# Patient Record
Sex: MALE | Race: WHITE | NOT HISPANIC OR LATINO | URBAN - METROPOLITAN AREA
[De-identification: names, ages, dates, MRNs, and addresses within clinical notes are randomized per-mention and may not be internally consistent; named-entity substitution may affect disease eponyms.]

---

## 2017-02-14 ENCOUNTER — OFFICE VISIT (OUTPATIENT)
Dept: CARDIOLOGY | Facility: CLINIC | Age: 77
End: 2017-02-14

## 2017-02-14 VITALS
HEART RATE: 77 BPM | HEIGHT: 72 IN | DIASTOLIC BLOOD PRESSURE: 72 MMHG | WEIGHT: 210 LBS | SYSTOLIC BLOOD PRESSURE: 120 MMHG | BODY MASS INDEX: 28.44 KG/M2

## 2017-02-14 DIAGNOSIS — I10 ESSENTIAL HYPERTENSION: ICD-10-CM

## 2017-02-14 DIAGNOSIS — I48.0 PAROXYSMAL ATRIAL FIBRILLATION (HCC): Primary | ICD-10-CM

## 2017-02-14 DIAGNOSIS — I44.30 AVB (ATRIOVENTRICULAR BLOCK): ICD-10-CM

## 2017-02-14 PROCEDURE — 99213 OFFICE O/P EST LOW 20 MIN: CPT | Performed by: INTERNAL MEDICINE

## 2017-02-14 PROCEDURE — 93288 INTERROG EVL PM/LDLS PM IP: CPT | Performed by: INTERNAL MEDICINE

## 2017-02-14 RX ORDER — FERROUS SULFATE 325(65) MG
325 TABLET ORAL
COMMUNITY

## 2017-02-14 RX ORDER — OMEPRAZOLE 20 MG/1
20 CAPSULE, DELAYED RELEASE ORAL DAILY
COMMUNITY

## 2017-02-14 RX ORDER — UBIDECARENONE 100 MG
100 CAPSULE ORAL DAILY
COMMUNITY

## 2017-02-14 NOTE — PROGRESS NOTES
Bladimir BERTRAND Kehrt  1940  [phone]      02/14/2017    [facility] [place] [facility]IOLOG[facility]     Soto Javed MD  [address] [address]    Chief Complaint   Patient presents with   • Atrial Fibrillation   • Slow Heart Rate       Problem List:    PROBLEM LIST:  1. Atrial flutter:  a. Diagnosed in 1998.   b. Treated with Coumadin in 2002.   c. Failed Amiodarone and Tambocor therapy.   d. External cardioversion to normal sinus rhythm in 2003.   e. Holter monitor on 12/24/2004 with episodes of atrial flutter.   f. Echo on 02/28/2004 with EF of 55%, mild to moderate mitral regurgitation, left atrial size 4.5 cm.   g. Radiofrequency ablation of isthmus dependent flutter on 08/15/2005 with subsequent inducible atrial fibrillation.   2. History of atrial fibrillation:  a. Initiation of Tambocor therapy in August 2005 secondary to inducible atrial fibrillation as well as left atrial flutter.   b. Recurrent persistent atrial fibrillation despite maximum doses of Tambocor and Rythmol therapy.   c. Pulmonary vein isolation procedure on 01/10/2006 with radiofrequency ablation of AVNRT at the same time.   d. Left atrial flutter in April 2006 with subsequent external cardioversion to normal sinus rhythm on 04/25/2006.  e. Hospitalization initiation of Tikosyn therapy 10/12/2006.   f. Implantation of a dual chamber permanent pacemaker secondary to symptomatic bradycardia (Medtronic device) on 01/15/2007.  g. External cardioversion of atrial fibrillation, March 2011 with subsequent recurrence of atrial fibrillation 48-hours later.   h. Persistent atrial fibrillation since October 2013 following his left knee surgical replacement.   i. Echocardiogram, 03/08/2013, LV size and function normal, left ventricular ejection fraction 55% to 60%, mild TR, moderate MR.  j. 10/07/2014 pacemaker generator change, Medtronic Adapta ADDR 01, serial #[id number] per Dr. Belcher at [facility]  [place].  3. Depression.   4. Obstructive sleep apnea.   5. Degenerative joint disease.  6. GI bleed:  a. Transfusion of 4 units of packed red blood cells during recent hospitalization, September 2015.  b. EGD/colonoscopy, no active bleeding.   7. Surgical history.  a. Right total knee replacement.   b. Left shoulder repair.  c. 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  d. Bowel resection, 07/1920/07, [facility].  Allergies  Allergies   Allergen Reactions   • Morphine And Related    • Zoloft [Sertraline Hcl]        Current Medications    Current Outpatient Prescriptions:   •  Alum Hydroxide-Mag Carbonate (GAVISCON PO), Take  by mouth as needed., Disp: , Rfl:   •  apixaban (ELIQUIS) 5 MG tablet tablet, Take 5 mg by mouth 2 (two) times a day., Disp: , Rfl:   •  celecoxib (CeleBREX) 200 MG capsule, Take 200 mg by mouth daily., Disp: , Rfl:   •  clonazePAM (KlonoPIN) 0.5 MG tablet, Take 0.5 mg by mouth 4 (four) times a day., Disp: , Rfl:   •  coenzyme Q10 100 MG capsule, Take 100 mg by mouth Daily., Disp: , Rfl:   •  diltiazem CD (CARDIZEM CD) 240 MG 24 hr capsule, Take 240 mg by mouth daily., Disp: , Rfl:   •  ferrous sulfate 325 (65 FE) MG tablet, Take 325 mg by mouth Daily With Breakfast., Disp: , Rfl:   •  HYDROcodone-acetaminophen (NORCO) 7.5-325 MG per tablet, Take 1 tablet by mouth 4 (four) times a day., Disp: , Rfl:   •  hydrOXYzine (ATARAX) 50 MG tablet, Take 50 mg by mouth daily., Disp: , Rfl:   •  lisinopril (PRINIVIL,ZESTRIL) 10 MG tablet, Take 10 mg by mouth daily., Disp: , Rfl:   •  magnesium gluconate (MAGONATE) 500 MG tablet, Take 500 mg by mouth daily., Disp: , Rfl:   •  Melatonin 10 MG capsule, Take 10 mg by mouth daily., Disp: , Rfl:   •  Multiple Vitamins-Minerals (MULTIVITAMIN ADULT PO), Take  by mouth daily., Disp: , Rfl:   •  omeprazole (priLOSEC) 20 MG capsule, Take 20 mg by mouth Daily., Disp: , Rfl:   •  QUEtiapine XR (SEROquel XR) 150 MG 24 hr tablet, Take 150 mg by mouth 2  "(two) times a day., Disp: , Rfl:   •  ranitidine (ZANTAC) 150 MG tablet, Take 150 mg by mouth 2 (two) times a day., Disp: , Rfl:   •  sertraline (ZOLOFT) 100 MG tablet, Take 100 mg by mouth daily., Disp: , Rfl:   •  traMADol-acetaminophen (ULTRACET) 37.5-325 MG per tablet, Take 1 tablet by mouth 4 (four) times a day., Disp: , Rfl:   •  vitamin C (ASCORBIC ACID) 500 MG tablet, Take 500 mg by mouth daily., Disp: , Rfl:   •  Vitamin D, Cholecalciferol, (CHOLECALCIFEROL) 400 UNITS tablet, Take 400 Units by mouth daily., Disp: , Rfl:   •  iron polysaccharides (FERREX 150) 150 MG capsule, Take 150 mg by mouth daily., Disp: , Rfl:     History of Present Illness   HPI    Pt presents for follow up of AF/bradycardia. Since the pt has seen us in clinic last, pt denies any syncope, SOB, CP, LH, and dizziness. Denies any hospitalizations, ER visits, bleeding, or TIA/CVA symptoms. Overall feels well except fo chronic fatigue. BP at home stable. Has had chronic right ankle ulcer issues.      ROS:  General:  + fatigue, No weight gain or loss  Cardiovascular:  Denies CP, PND, syncope, near syncope, edema or palpitations.  Pulmonary:  No OLIVER, cough, or wheezing    Vitals:    02/14/17 1111   BP: 120/72   BP Location: Left arm   Pulse: 77   Weight: 210 lb (95.3 kg)   Height: 72\" (182.9 cm)       PE:  NAD  Neck: no JVD, no carotid bruits, no TM  Heart RRR, NL S1, S2, S4 present, no rubs, murmurs  Lungs: CTA  Abd: soft, non-tender, NL BS  Ext: No musculoskeletal deformities    Diagnostic Data:  Procedures      1. Paroxysmal atrial fibrillation    2. AVB (atrioventricular block)    3. Essential hypertension        PM Interrogation: NL PM fxn, Nl battery fxn, 85% AF     Plan:  1) AF: asymptomatic no change in frequency  Continue present medications. NL pacemaker function.  2) Anticoagulation  Continue NOAC  3) Bradycardia: NL fxn  4) HTN: stable  Wt loss, exercise, salt reduction    F/up in 6 months      "

## 2017-05-31 ENCOUNTER — CLINICAL SUPPORT NO REQUIREMENTS (OUTPATIENT)
Dept: CARDIOLOGY | Facility: CLINIC | Age: 77
End: 2017-05-31

## 2017-05-31 DIAGNOSIS — I48.0 PAROXYSMAL ATRIAL FIBRILLATION (HCC): ICD-10-CM

## 2017-05-31 DIAGNOSIS — I44.30 ATRIOVENTRICULAR BLOC: ICD-10-CM

## 2017-05-31 PROCEDURE — 93294 REM INTERROG EVL PM/LDLS PM: CPT | Performed by: INTERNAL MEDICINE

## 2017-05-31 PROCEDURE — 93296 REM INTERROG EVL PM/IDS: CPT | Performed by: INTERNAL MEDICINE

## 2017-09-12 ENCOUNTER — OFFICE VISIT (OUTPATIENT)
Dept: CARDIOLOGY | Facility: CLINIC | Age: 77
End: 2017-09-12

## 2017-09-12 VITALS
HEART RATE: 74 BPM | HEIGHT: 72 IN | SYSTOLIC BLOOD PRESSURE: 136 MMHG | BODY MASS INDEX: 29.12 KG/M2 | DIASTOLIC BLOOD PRESSURE: 70 MMHG | WEIGHT: 215 LBS

## 2017-09-12 DIAGNOSIS — I10 ESSENTIAL HYPERTENSION: ICD-10-CM

## 2017-09-12 DIAGNOSIS — Z86.79 HISTORY OF ATRIAL FIBRILLATION: ICD-10-CM

## 2017-09-12 DIAGNOSIS — R00.1 BRADYCARDIA: ICD-10-CM

## 2017-09-12 DIAGNOSIS — I48.4 ATYPICAL ATRIAL FLUTTER (HCC): Primary | ICD-10-CM

## 2017-09-12 PROCEDURE — 99213 OFFICE O/P EST LOW 20 MIN: CPT | Performed by: INTERNAL MEDICINE

## 2017-09-12 PROCEDURE — 93288 INTERROG EVL PM/LDLS PM IP: CPT | Performed by: INTERNAL MEDICINE

## 2017-09-12 NOTE — PROGRESS NOTES
Bladimir BERTRAND Kehrt  1940  [phone]      09/12/2017    [facility] CARDIOLOGY     Soto Javed MD  [address] [address]    Chief Complaint   Patient presents with   • Atrial Fibrillation   • Slow Heart Rate       Problem List:  PROBLEM LIST:  1. Atrial flutter:  a. Diagnosed in 1998.   b. Treated with Coumadin in 2002.   c. Failed Amiodarone and Tambocor therapy.   d. External cardioversion to normal sinus rhythm in 2003.   e. Holter monitor on 12/24/2004 with episodes of atrial flutter.   f. Echo on 02/28/2004 with EF of 55%, mild to moderate mitral regurgitation, left atrial size 4.5 cm.   g. Radiofrequency ablation of isthmus dependent flutter on 08/15/2005 with subsequent inducible atrial fibrillation.   2. History of atrial fibrillation:  a. Initiation of Tambocor therapy in August 2005 secondary to inducible atrial fibrillation as well as left atrial flutter.   b. Recurrent persistent atrial fibrillation despite maximum doses of Tambocor and Rythmol therapy.   c. Pulmonary vein isolation procedure on 01/10/2006 with radiofrequency ablation of AVNRT at the same time.   d. Left atrial flutter in April 2006 with subsequent external cardioversion to normal sinus rhythm on 04/25/2006.  e. Hospitalization initiation of Tikosyn therapy 10/12/2006.   f. Implantation of a dual chamber permanent pacemaker secondary to symptomatic bradycardia (Medtronic device) on 01/15/2007.  g. External cardioversion of atrial fibrillation, March 2011 with subsequent recurrence of atrial fibrillation 48-hours later.   h. Persistent atrial fibrillation since October 2013 following his left knee surgical replacement.   i. Echocardiogram, 03/08/2013, LV size and function normal, left ventricular ejection fraction 55% to 60%, mild TR, moderate MR.  j. 10/07/2014 pacemaker generator change, Medtronic Adapta ADDR 01, serial #[id number] per Dr. Belcher at [facility].  3. Depression.    4. Obstructive sleep apnea.   5. Degenerative joint disease.  6. GI bleed:  a. Transfusion of 4 units of packed red blood cells during recent hospitalization, September 2015.  b. EGD/colonoscopy, no active bleeding.   7. Surgical history.  a. Right total knee replacement.   b. Left shoulder repair.  c. 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  d. Bowel resection, 07/1920/07, [facility].    Allergies  Allergies   Allergen Reactions   • Morphine And Related    • Zoloft [Sertraline Hcl]        Current Medications    Current Outpatient Prescriptions:   •  Alum Hydroxide-Mag Carbonate (GAVISCON PO), Take  by mouth as needed., Disp: , Rfl:   •  apixaban (ELIQUIS) 5 MG tablet tablet, Take 5 mg by mouth 2 (two) times a day., Disp: , Rfl:   •  celecoxib (CeleBREX) 200 MG capsule, Take 200 mg by mouth daily., Disp: , Rfl:   •  clonazePAM (KlonoPIN) 0.5 MG tablet, Take 0.5 mg by mouth 4 (four) times a day., Disp: , Rfl:   •  coenzyme Q10 100 MG capsule, Take 100 mg by mouth Daily., Disp: , Rfl:   •  diltiazem CD (CARDIZEM CD) 240 MG 24 hr capsule, Take 240 mg by mouth daily., Disp: , Rfl:   •  ferrous sulfate 325 (65 FE) MG tablet, Take 325 mg by mouth Daily With Breakfast., Disp: , Rfl:   •  HYDROcodone-acetaminophen (NORCO) 7.5-325 MG per tablet, Take 1 tablet by mouth 4 (four) times a day., Disp: , Rfl:   •  hydrOXYzine (ATARAX) 50 MG tablet, Take 50 mg by mouth daily., Disp: , Rfl:   •  lisinopril (PRINIVIL,ZESTRIL) 10 MG tablet, Take 10 mg by mouth daily., Disp: , Rfl:   •  magnesium gluconate (MAGONATE) 500 MG tablet, Take 500 mg by mouth daily., Disp: , Rfl:   •  Melatonin 10 MG capsule, Take 10 mg by mouth daily., Disp: , Rfl:   •  Multiple Vitamins-Minerals (MULTIVITAMIN ADULT PO), Take  by mouth daily., Disp: , Rfl:   •  omeprazole (priLOSEC) 20 MG capsule, Take 20 mg by mouth Daily., Disp: , Rfl:   •  QUEtiapine XR (SEROquel XR) 150 MG 24 hr tablet, Take 150 mg by mouth 2 (two) times a day.,  "Disp: , Rfl:   •  ranitidine (ZANTAC) 150 MG tablet, Take 150 mg by mouth 2 (two) times a day., Disp: , Rfl:   •  sertraline (ZOLOFT) 100 MG tablet, Take 100 mg by mouth daily., Disp: , Rfl:   •  traMADol-acetaminophen (ULTRACET) 37.5-325 MG per tablet, Take 1 tablet by mouth 4 (four) times a day., Disp: , Rfl:   •  vitamin C (ASCORBIC ACID) 500 MG tablet, Take 500 mg by mouth daily., Disp: , Rfl:   •  Vitamin D, Cholecalciferol, (CHOLECALCIFEROL) 400 UNITS tablet, Take 400 Units by mouth daily., Disp: , Rfl:     History of Present Illness   HPI    Pt presents for follow up of AF/bradycardia. Since the pt has seen us in clinic last, pt denies any syncope,  CP, LH, and dizziness. Denies any hospitalizations, ER visits, bleeding, or TIA/CVA symptoms. Overall feels well except for fatigue. Anemia significantly improved. Still being treated for ankle ulcer. BP stable at home.    ROS:  General:  + fatigue, No weight gain or loss  Cardiovascular:  Denies CP, PND, syncope, near syncope, edema + rare palpitations.  Pulmonary:  Mild  OLIVER, no cough, or wheezing    Vitals:    09/12/17 1135   BP: 136/70   BP Location: Left arm   Patient Position: Sitting   Pulse: 74   Weight: 215 lb (97.5 kg)   Height: 72\" (182.9 cm)       PE:  General: NAD  Neck: no JVD, no carotid bruits, no TM  Heart RRR, NL S1, S2, S4 present, no rubs, murmurs  Lungs: CTA, no wheezes, rhonchi, or rales  Abd: soft, non-tender, NL BS  Ext: No musculoskeletal deformities, no edema, cyanosis, or clubbing  Psych: normal mood and affect    Diagnostic Data:  Procedures      1. Atypical atrial flutter    2. History of atrial fibrillation    3. Bradycardia    4. Essential hypertension        PM Interrogation: NL PM fxn, Nl battery fxn, 86% AF       Plan:  1) AF: asymptomatic no change in frequency  Continue present medications. NL pacemaker function.  2) Anticoagulation  Continue NOAC  3) Bradycardia: NL fxn  4) HTN: stable  Wt loss, exercise, salt reduction    F/up " in 6 months

## 2017-11-30 ENCOUNTER — CLINICAL SUPPORT NO REQUIREMENTS (OUTPATIENT)
Dept: CARDIOLOGY | Facility: CLINIC | Age: 77
End: 2017-11-30

## 2017-11-30 DIAGNOSIS — I44.2 ATRIOVENTRICULAR BLOCK, COMPLETE (HCC): ICD-10-CM

## 2017-11-30 PROCEDURE — 93294 REM INTERROG EVL PM/LDLS PM: CPT | Performed by: INTERNAL MEDICINE

## 2017-11-30 PROCEDURE — 93296 REM INTERROG EVL PM/IDS: CPT | Performed by: INTERNAL MEDICINE

## 2018-05-23 ENCOUNTER — TELEPHONE (OUTPATIENT)
Dept: CARDIOLOGY | Facility: CLINIC | Age: 78
End: 2018-05-23

## 2018-05-24 ENCOUNTER — TELEPHONE (OUTPATIENT)
Dept: CARDIOLOGY | Facility: CLINIC | Age: 78
End: 2018-05-24

## 2018-05-24 NOTE — TELEPHONE ENCOUNTER
Patient has had an ulcer on his right ankle for 3 years. He has not had his procedure yet, but the doctor would like to hold Eliquis for 2 or 3 days. The ulcer is continuously bleeding and he feels like it is the Eliquis. Would this be ok with you?

## 2018-06-13 ENCOUNTER — TELEPHONE (OUTPATIENT)
Dept: CARDIOLOGY | Facility: CLINIC | Age: 78
End: 2018-06-13

## 2018-06-13 ENCOUNTER — CLINICAL SUPPORT NO REQUIREMENTS (OUTPATIENT)
Dept: CARDIOLOGY | Facility: CLINIC | Age: 78
End: 2018-06-13

## 2018-06-13 DIAGNOSIS — I48.4 ATYPICAL ATRIAL FLUTTER (HCC): ICD-10-CM

## 2018-06-13 DIAGNOSIS — R00.1 BRADYCARDIA: ICD-10-CM

## 2018-06-13 PROCEDURE — 93296 REM INTERROG EVL PM/IDS: CPT | Performed by: INTERNAL MEDICINE

## 2018-06-13 PROCEDURE — 93294 REM INTERROG EVL PM/LDLS PM: CPT | Performed by: INTERNAL MEDICINE

## 2018-06-13 NOTE — TELEPHONE ENCOUNTER
Called and left message regarding it is time to send in a reading on his pacemaker with his [employer] box.  Left my name and number if he has any questions.

## 2018-07-05 ENCOUNTER — TELEPHONE (OUTPATIENT)
Dept: CARDIOLOGY | Facility: CLINIC | Age: 78
End: 2018-07-05

## 2018-07-05 NOTE — TELEPHONE ENCOUNTER
MD Barragan called for medical clearance for a leg debridement. They want to know how many days Eliquis 5mg needs to be held? thanks

## 2018-12-11 ENCOUNTER — OFFICE VISIT (OUTPATIENT)
Dept: CARDIOLOGY | Facility: CLINIC | Age: 78
End: 2018-12-11

## 2018-12-11 VITALS
DIASTOLIC BLOOD PRESSURE: 70 MMHG | HEART RATE: 80 BPM | OXYGEN SATURATION: 97 % | BODY MASS INDEX: 27.09 KG/M2 | WEIGHT: 200 LBS | HEIGHT: 72 IN | SYSTOLIC BLOOD PRESSURE: 130 MMHG

## 2018-12-11 DIAGNOSIS — I48.4 ATYPICAL ATRIAL FLUTTER (HCC): Primary | ICD-10-CM

## 2018-12-11 DIAGNOSIS — I48.19 PERSISTENT ATRIAL FIBRILLATION (HCC): ICD-10-CM

## 2018-12-11 DIAGNOSIS — R00.1 BRADYCARDIA: ICD-10-CM

## 2018-12-11 DIAGNOSIS — I10 ESSENTIAL HYPERTENSION: ICD-10-CM

## 2018-12-11 PROCEDURE — 99213 OFFICE O/P EST LOW 20 MIN: CPT | Performed by: NURSE PRACTITIONER

## 2018-12-11 PROCEDURE — 93280 PM DEVICE PROGR EVAL DUAL: CPT | Performed by: NURSE PRACTITIONER

## 2018-12-11 NOTE — PROGRESS NOTES
Bladimir Oliveirahugo  1940    There is no work phone number on file.      12/11/2018    [facility] CARDIOLOGY     House, MD Soto  [address] [place] [id number]    Chief Complaint   Patient presents with   • Atrial Fibrillation       Problem List:   1. Atrial flutter:  a. Diagnosed in 1998.   b. Treated with Coumadin in 2002.   c. Failed Amiodarone and Tambocor therapy.   d. External cardioversion to normal sinus rhythm in 2003.   e. Holter monitor on 12/24/2004 with episodes of atrial flutter.   f. Echo on 02/28/2004 with EF of 55%, mild to moderate mitral regurgitation, left atrial size 4.5 cm.   g. Radiofrequency ablation of isthmus dependent flutter on 08/15/2005 with subsequent inducible atrial fibrillation.   2. History of atrial fibrillation:  a. Initiation of Tambocor therapy in August 2005 secondary to inducible atrial fibrillation as well as left atrial flutter.   b. Recurrent persistent atrial fibrillation despite maximum doses of Tambocor and Rythmol therapy.   c. Pulmonary vein isolation procedure on 01/10/2006 with radiofrequency ablation of AVNRT at the same time.   d. Left atrial flutter in April 2006 with subsequent external cardioversion to normal sinus rhythm on 04/25/2006.  e. Hospitalization initiation of Tikosyn therapy 10/12/2006.   f. Implantation of a dual chamber permanent pacemaker secondary to symptomatic bradycardia (Medtronic device) on 01/15/2007.  g. External cardioversion of atrial fibrillation, March 2011 with subsequent recurrence of atrial fibrillation 48-hours later.   h. Persistent atrial fibrillation since October 2013 following his left knee surgical replacement.   i. Echocardiogram, 03/08/2013, LV size and function normal, left ventricular ejection fraction 55% to 60%, mild TR, moderate MR.  j. 10/07/2014 pacemaker generator change, Medtronic Adapta ADDR 01, serial #[id number] per Dr. Belcher at [facility].  3. Depression.    4. Obstructive sleep apnea.   5. Degenerative joint disease.  6. GI bleed:  a. Transfusion of 4 units of packed red blood cells during recent hospitalization, September 2015.  b. EGD/colonoscopy, no active bleeding.   7. Surgical history.  a. Right total knee replacement.   b. Left shoulder repair.  c. 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  d. Bowel resection, 07/1920/07, [facility].          Allergies  Allergies   Allergen Reactions   • Morphine And Related    • Zoloft [Sertraline Hcl]        Current Medications    Current Outpatient Medications:   •  Alum Hydroxide-Mag Carbonate (GAVISCON PO), Take  by mouth as needed., Disp: , Rfl:   •  apixaban (ELIQUIS) 5 MG tablet tablet, Take 5 mg by mouth 2 (two) times a day., Disp: , Rfl:   •  celecoxib (CeleBREX) 200 MG capsule, Take 200 mg by mouth daily., Disp: , Rfl:   •  clonazePAM (KlonoPIN) 0.5 MG tablet, Take 0.5 mg by mouth 4 (four) times a day., Disp: , Rfl:   •  coenzyme Q10 100 MG capsule, Take 100 mg by mouth Daily., Disp: , Rfl:   •  diltiazem CD (CARDIZEM CD) 240 MG 24 hr capsule, Take 240 mg by mouth daily., Disp: , Rfl:   •  ferrous sulfate 325 (65 FE) MG tablet, Take 325 mg by mouth Daily With Breakfast., Disp: , Rfl:   •  HYDROcodone-acetaminophen (NORCO) 7.5-325 MG per tablet, Take 1 tablet by mouth 4 (four) times a day., Disp: , Rfl:   •  hydrOXYzine (ATARAX) 50 MG tablet, Take 50 mg by mouth daily., Disp: , Rfl:   •  lisinopril (PRINIVIL,ZESTRIL) 10 MG tablet, Take 10 mg by mouth daily., Disp: , Rfl:   •  magnesium gluconate (MAGONATE) 500 MG tablet, Take 500 mg by mouth daily., Disp: , Rfl:   •  Melatonin 10 MG capsule, Take 10 mg by mouth Every Night., Disp: , Rfl:   •  Multiple Vitamins-Minerals (MULTIVITAMIN ADULT PO), Take  by mouth daily., Disp: , Rfl:   •  omeprazole (priLOSEC) 20 MG capsule, Take 20 mg by mouth Daily., Disp: , Rfl:   •  QUEtiapine XR (SEROquel XR) 150 MG 24 hr tablet, Take 150 mg by mouth 2 (two) times a  "day., Disp: , Rfl:   •  ranitidine (ZANTAC) 150 MG tablet, Take 150 mg by mouth 2 (two) times a day., Disp: , Rfl:   •  sertraline (ZOLOFT) 100 MG tablet, Take 100 mg by mouth daily., Disp: , Rfl:   •  traMADol-acetaminophen (ULTRACET) 37.5-325 MG per tablet, Take 1 tablet by mouth 4 (four) times a day., Disp: , Rfl:   •  vitamin C (ASCORBIC ACID) 500 MG tablet, Take 500 mg by mouth daily., Disp: , Rfl:   •  Vitamin D, Cholecalciferol, (CHOLECALCIFEROL) 400 UNITS tablet, Take 400 Units by mouth daily., Disp: , Rfl:     History of Present Illness   HPI    Pt presents for follow up of atrial fibrillation, bradycardia, PM check. Since we last saw the pt, pt overall unaware of AF episodes, only notes occasional palpitations.  He denies any SOB, CP, LH, and dizziness. Denies any bleeding on Eliquis, or TIA/CVA symptoms. Overall feels well.  He is s/p right ankle surgery, right ankle now in brace.  Home health coming twice weekly.  Ambulating with walker.  He notes continued improvement in healing.    ROS:  General:  + fatigue, no weight gain or loss  Cardiovascular:  Denies CP, PND, syncope, near syncope, edema, + occasional palpitations.  Pulmonary:  Denies OLIVER, cough, or wheezing      Vitals:    12/11/18 1228   BP: 130/70   BP Location: Right arm   Patient Position: Sitting   Pulse: 80   SpO2: 97%   Weight: 90.7 kg (200 lb)   Height: 182.9 cm (72\")     PE:  General: NAD  Neck: no JVD, no carotid bruits, no TM  Heart RRR, NL S1, S2, S4 present, no rubs, 2/6 systolic ejection murmur  Lungs: CTA, no wheezes, rhonchi, or rales  Abd: soft, non-tender, NL BS  Ext: R ankle in brace, no edema, cyanosis, or clubbing  Psych: normal mood and affect    Diagnostic Data:  Device interrogation: Medtronic PPM with normal function, 36% RA paced, 63% RV paced, 7 years battery life, 42% AF     Procedures    1. Atypical atrial flutter (CMS/HCC)    2. Persistent atrial fibrillation (CMS/HCC)    3. Bradycardia    4. Essential hypertension  "         Plan:  1) Persistent atrial fibrillation/flutter:  - Less AF burden on today's check.  Rates overall controlled and patient for the most part asymptomatic.   - Continue present medications.   2) Anticoagulation:  - Continue Eliquis  3) Bradycardia:  - S/p PPM, device with normal function  4) HTN:  - Well controlled on current meds  - Wt loss, exercise, salt reduction    F/up in 6 months    [facility]  12/11/2018  1:23 PM

## 2019-01-01 ENCOUNTER — CLINICAL SUPPORT NO REQUIREMENTS (OUTPATIENT)
Dept: CARDIOLOGY | Facility: CLINIC | Age: 79
End: 2019-01-01

## 2019-01-01 ENCOUNTER — TELEPHONE (OUTPATIENT)
Dept: CARDIOLOGY | Facility: CLINIC | Age: 79
End: 2019-01-01

## 2019-01-01 ENCOUNTER — OFFICE VISIT (OUTPATIENT)
Dept: CARDIOLOGY | Facility: CLINIC | Age: 79
End: 2019-01-01

## 2019-01-01 VITALS
BODY MASS INDEX: 27.12 KG/M2 | DIASTOLIC BLOOD PRESSURE: 70 MMHG | SYSTOLIC BLOOD PRESSURE: 125 MMHG | WEIGHT: 200 LBS | HEART RATE: 96 BPM

## 2019-01-01 DIAGNOSIS — R00.1 BRADYCARDIA: ICD-10-CM

## 2019-01-01 DIAGNOSIS — I48.19 PERSISTENT ATRIAL FIBRILLATION (HCC): Primary | ICD-10-CM

## 2019-01-01 DIAGNOSIS — I10 ESSENTIAL HYPERTENSION: ICD-10-CM

## 2019-01-01 DIAGNOSIS — I48.19 PERSISTENT ATRIAL FIBRILLATION (HCC): ICD-10-CM

## 2019-01-01 PROCEDURE — 99213 OFFICE O/P EST LOW 20 MIN: CPT | Performed by: INTERNAL MEDICINE

## 2019-01-01 PROCEDURE — 93294 REM INTERROG EVL PM/LDLS PM: CPT | Performed by: INTERNAL MEDICINE

## 2019-01-01 PROCEDURE — 93280 PM DEVICE PROGR EVAL DUAL: CPT | Performed by: INTERNAL MEDICINE

## 2019-01-01 PROCEDURE — 93296 REM INTERROG EVL PM/IDS: CPT | Performed by: INTERNAL MEDICINE

## 2019-05-01 NOTE — TELEPHONE ENCOUNTER
Spoke with wife and ask her to remind Mr Kehrt his transmission is due today. She verbalized understanding.

## 2019-07-09 NOTE — PROGRESS NOTES
Bladimir BERTRAND Kehrt  1940    There is no work phone number on file.      07/09/2019    [facility] [place] CARDIOLOGY     House, MD [place]  [address] [place] [id number]    Chief Complaint   Patient presents with   • Atypical atrial flutter (CMS/HCC)       Problem List:   1. Atrial flutter:  a. Diagnosed in 1998.   b. Treated with Coumadin in 2002.   c. Failed Amiodarone and Tambocor therapy.   d. External cardioversion to normal sinus rhythm in 2003.   e. Holter monitor on 12/24/2004 with episodes of atrial flutter.   f. Echo on 02/28/2004 with EF of 55%, mild to moderate mitral regurgitation, left atrial size 4.5 cm.   g. Radiofrequency ablation of isthmus dependent flutter on 08/15/2005 with subsequent inducible atrial fibrillation.   2. History of atrial fibrillation:  a. Initiation of Tambocor therapy in August 2005 secondary to inducible atrial fibrillation as well as left atrial flutter.   b. Recurrent persistent atrial fibrillation despite maximum doses of Tambocor and Rythmol therapy.   c. Pulmonary vein isolation procedure on 01/10/2006 with radiofrequency ablation of AVNRT at the same time.   d. Left atrial flutter in April 2006 with subsequent external cardioversion to normal sinus rhythm on 04/25/2006.  e. Hospitalization initiation of Tikosyn therapy 10/12/2006.   f. Implantation of a dual chamber permanent pacemaker secondary to symptomatic bradycardia (Medtronic device) on 01/15/2007.  g. External cardioversion of atrial fibrillation, March 2011 with subsequent recurrence of atrial fibrillation 48-hours later.   h. Persistent atrial fibrillation since October 2013 following his left knee surgical replacement.   i. Echocardiogram, 03/08/2013, LV size and function normal, left ventricular ejection fraction 55% to 60%, mild TR, moderate MR.  j. 10/07/2014 pacemaker generator change, Medtronic Adapta ADDR 01, serial #[id number] per Dr. Belcher at [facility]  [place].  3. Depression.   4. Obstructive sleep apnea.   5. Degenerative joint disease.  6. GI bleed:  a. Transfusion of 4 units of packed red blood cells during recent hospitalization, September 2015.  b. EGD/colonoscopy, no active bleeding.   7. Surgical history.  a. Right total knee replacement.   b. Left shoulder repair.  c. 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  d. Bowel resection, 07/1920/07, [facility].          Allergies  Allergies   Allergen Reactions   • Ciprofloxacin Hives and Shortness Of Breath   • Levaquin [Levofloxacin] Dizziness   • Morphine And Related    • Zoloft [Sertraline Hcl]    • Penicillins Rash       Current Medications    Current Outpatient Medications:   •  Alum Hydroxide-Mag Carbonate (GAVISCON PO), Take  by mouth as needed., Disp: , Rfl:   •  apixaban (ELIQUIS) 5 MG tablet tablet, Take 5 mg by mouth 2 (two) times a day., Disp: , Rfl:   •  celecoxib (CeleBREX) 200 MG capsule, Take 200 mg by mouth daily., Disp: , Rfl:   •  clonazePAM (KlonoPIN) 0.5 MG tablet, Take 0.5 mg by mouth 4 (four) times a day., Disp: , Rfl:   •  coenzyme Q10 100 MG capsule, Take 100 mg by mouth Daily., Disp: , Rfl:   •  diltiazem CD (CARDIZEM CD) 240 MG 24 hr capsule, Take 240 mg by mouth daily., Disp: , Rfl:   •  ferrous sulfate 325 (65 FE) MG tablet, Take 325 mg by mouth Daily With Breakfast., Disp: , Rfl:   •  HYDROcodone-acetaminophen (NORCO) 7.5-325 MG per tablet, Take 1 tablet by mouth 4 (four) times a day., Disp: , Rfl:   •  hydrOXYzine (ATARAX) 50 MG tablet, Take 50 mg by mouth daily., Disp: , Rfl:   •  lisinopril (PRINIVIL,ZESTRIL) 10 MG tablet, Take 10 mg by mouth daily., Disp: , Rfl:   •  magnesium gluconate (MAGONATE) 500 MG tablet, Take 500 mg by mouth daily., Disp: , Rfl:   •  Melatonin 10 MG capsule, Take 10 mg by mouth Every Night., Disp: , Rfl:   •  Multiple Vitamins-Minerals (MULTIVITAMIN ADULT PO), Take  by mouth daily., Disp: , Rfl:   •  omeprazole (priLOSEC) 20 MG  capsule, Take 20 mg by mouth Daily., Disp: , Rfl:   •  QUEtiapine XR (SEROquel XR) 150 MG 24 hr tablet, Take 150 mg by mouth 2 (two) times a day., Disp: , Rfl:   •  ranitidine (ZANTAC) 150 MG tablet, Take 150 mg by mouth 2 (two) times a day., Disp: , Rfl:   •  sertraline (ZOLOFT) 100 MG tablet, Take 100 mg by mouth daily., Disp: , Rfl:   •  traMADol-acetaminophen (ULTRACET) 37.5-325 MG per tablet, Take 1 tablet by mouth 4 (four) times a day., Disp: , Rfl:   •  vitamin C (ASCORBIC ACID) 500 MG tablet, Take 500 mg by mouth daily., Disp: , Rfl:   •  Vitamin D, Cholecalciferol, (CHOLECALCIFEROL) 400 UNITS tablet, Take 400 Units by mouth daily., Disp: , Rfl:     History of Present Illness   HPI    Pt presents for follow up of AF, bradycardia, PM check. Since we last saw the pt, pt denies any significant awarenss of AF episodes, CP, LH, or dizziness. Has OLIVER which has been stable.  Denies any hospitalizations, ER visits, bleeding, or TIA/CVA symptoms. Overall feels well. BP well controlled at home.       ROS:  General:  + fatigue, no weight gain or loss  Cardiovascular:  Denies CP, PND, syncope, near syncope, edema or palpitations.  Pulmonary:  + OLIVER, cough, or wheezing      Vitals:    07/09/19 1315   BP: 125/70   BP Location: Right arm   Patient Position: Sitting   Pulse: 96   Weight: 90.7 kg (200 lb)     PE:  General: NAD  Neck: no JVD, no carotid bruits, no TM  Heart RRR, NL S1, S2, no rubs, murmurs  Lungs: CTA, no wheezes, rhonchi, or rales  Abd: soft, non-tender, NL BS  Ext: No musculoskeletal deformities, no edema, cyanosis, or clubbing  Psych: normal mood and affect    Diagnostic Data:      Procedures    1. Persistent atrial fibrillation (CMS/HCC)    2. Bradycardia    3. Essential hypertension      Manual device interrogation: MDT PPM with normal function, 5.5 years battery life, 36% AF burden    Plan:  1) Persistent atrial fibrillation:  - Slightly improved AF burden compared to last visit, overall asymptomatic  with episodes  - Continue present medications.   2) Anticoagulation:  - Continue Eliquis  3) Bradycardia:  - S/p PPM implant, device with normal function  4) HTN:  - Well controlled on lisinopril  - Wt loss, exercise, salt reduction    F/up in 10 months    Scribed for Lupillo Belcher MD by Aide Avilez, GAUDENCIO. 7/9/2019  1:37 PM     I, Lupillo Belcher MD, personally performed the services described in this documentation as scribed by the above named individual in my presence, and it is both accurate and complete.  7/9/2019  1:46 PM